# Patient Record
Sex: FEMALE | Race: WHITE | Employment: UNEMPLOYED | URBAN - METROPOLITAN AREA
[De-identification: names, ages, dates, MRNs, and addresses within clinical notes are randomized per-mention and may not be internally consistent; named-entity substitution may affect disease eponyms.]

---

## 2024-01-20 ENCOUNTER — HOSPITAL ENCOUNTER (OUTPATIENT)
Facility: HOSPITAL | Age: 39
Setting detail: OBSERVATION
Discharge: HOME | End: 2024-01-21
Attending: EMERGENCY MEDICINE | Admitting: HOSPITALIST
Payer: COMMERCIAL

## 2024-01-20 ENCOUNTER — APPOINTMENT (OUTPATIENT)
Dept: GENERAL RADIOLOGY | Facility: HOSPITAL | Age: 39
End: 2024-01-20
Attending: EMERGENCY MEDICINE
Payer: COMMERCIAL

## 2024-01-20 DIAGNOSIS — R94.31 ABNORMAL EKG: ICD-10-CM

## 2024-01-20 DIAGNOSIS — R07.9 CHEST PAIN OF UNCERTAIN ETIOLOGY: Primary | ICD-10-CM

## 2024-01-20 LAB
ALBUMIN SERPL-MCNC: 3.5 G/DL (ref 3.4–5)
ALBUMIN/GLOB SERPL: 0.9 {RATIO} (ref 1–2)
ALP LIVER SERPL-CCNC: 56 U/L
ANION GAP SERPL CALC-SCNC: 1 MMOL/L (ref 0–18)
AST SERPL-CCNC: 11 U/L (ref 15–37)
BASOPHILS # BLD AUTO: 0.05 X10(3) UL (ref 0–0.2)
BASOPHILS NFR BLD AUTO: 0.3 %
BILIRUB SERPL-MCNC: 0.3 MG/DL (ref 0.1–2)
BUN BLD-MCNC: 10 MG/DL (ref 9–23)
CALCIUM BLD-MCNC: 8.6 MG/DL (ref 8.5–10.1)
CHLORIDE SERPL-SCNC: 111 MMOL/L (ref 98–112)
CO2 SERPL-SCNC: 29 MMOL/L (ref 21–32)
CREAT BLD-MCNC: 0.59 MG/DL
D DIMER PPP FEU-MCNC: 0.27 UG/ML FEU (ref ?–0.5)
EGFRCR SERPLBLD CKD-EPI 2021: 117 ML/MIN/1.73M2 (ref 60–?)
EOSINOPHIL # BLD AUTO: 0.19 X10(3) UL (ref 0–0.7)
EOSINOPHIL NFR BLD AUTO: 1.1 %
ERYTHROCYTE [DISTWIDTH] IN BLOOD BY AUTOMATED COUNT: 12.3 %
FLUAV + FLUBV RNA SPEC NAA+PROBE: NEGATIVE
FLUAV + FLUBV RNA SPEC NAA+PROBE: NEGATIVE
GLOBULIN PLAS-MCNC: 3.8 G/DL (ref 2.8–4.4)
GLUCOSE BLD-MCNC: 138 MG/DL (ref 70–99)
HCT VFR BLD AUTO: 33.9 %
HGB BLD-MCNC: 11.7 G/DL
IMM GRANULOCYTES # BLD AUTO: 0.07 X10(3) UL (ref 0–1)
IMM GRANULOCYTES NFR BLD: 0.4 %
LYMPHOCYTES # BLD AUTO: 1.13 X10(3) UL (ref 1–4)
LYMPHOCYTES NFR BLD AUTO: 6.5 %
MCH RBC QN AUTO: 28.7 PG (ref 26–34)
MCHC RBC AUTO-ENTMCNC: 34.5 G/DL (ref 31–37)
MCV RBC AUTO: 83.3 FL
MONOCYTES # BLD AUTO: 1.16 X10(3) UL (ref 0.1–1)
MONOCYTES NFR BLD AUTO: 6.7 %
NEUTROPHILS # BLD AUTO: 14.69 X10 (3) UL (ref 1.5–7.7)
NEUTROPHILS # BLD AUTO: 14.69 X10(3) UL (ref 1.5–7.7)
NEUTROPHILS NFR BLD AUTO: 85 %
OSMOLALITY SERPL CALC.SUM OF ELEC: 293 MOSM/KG (ref 275–295)
PLATELET # BLD AUTO: 319 10(3)UL (ref 150–450)
POTASSIUM SERPL-SCNC: 3.7 MMOL/L (ref 3.5–5.1)
PROT SERPL-MCNC: 7.3 G/DL (ref 6.4–8.2)
RBC # BLD AUTO: 4.07 X10(6)UL
RSV RNA SPEC NAA+PROBE: NEGATIVE
SARS-COV-2 RNA RESP QL NAA+PROBE: NOT DETECTED
SODIUM SERPL-SCNC: 141 MMOL/L (ref 136–145)
TROPONIN I SERPL HS-MCNC: 8 NG/L
WBC # BLD AUTO: 17.3 X10(3) UL (ref 4–11)

## 2024-01-20 PROCEDURE — 84484 ASSAY OF TROPONIN QUANT: CPT | Performed by: EMERGENCY MEDICINE

## 2024-01-20 PROCEDURE — 85379 FIBRIN DEGRADATION QUANT: CPT | Performed by: EMERGENCY MEDICINE

## 2024-01-20 PROCEDURE — 80053 COMPREHEN METABOLIC PANEL: CPT | Performed by: EMERGENCY MEDICINE

## 2024-01-20 PROCEDURE — 85025 COMPLETE CBC W/AUTO DIFF WBC: CPT | Performed by: EMERGENCY MEDICINE

## 2024-01-20 PROCEDURE — 93010 ELECTROCARDIOGRAM REPORT: CPT

## 2024-01-20 PROCEDURE — 0241U SARS-COV-2/FLU A AND B/RSV BY PCR (GENEXPERT): CPT | Performed by: EMERGENCY MEDICINE

## 2024-01-20 PROCEDURE — 99285 EMERGENCY DEPT VISIT HI MDM: CPT

## 2024-01-20 PROCEDURE — 36415 COLL VENOUS BLD VENIPUNCTURE: CPT

## 2024-01-20 PROCEDURE — 93005 ELECTROCARDIOGRAM TRACING: CPT

## 2024-01-20 PROCEDURE — 71045 X-RAY EXAM CHEST 1 VIEW: CPT | Performed by: EMERGENCY MEDICINE

## 2024-01-21 ENCOUNTER — APPOINTMENT (OUTPATIENT)
Dept: CV DIAGNOSTICS | Facility: HOSPITAL | Age: 39
End: 2024-01-21
Attending: HOSPITALIST
Payer: COMMERCIAL

## 2024-01-21 VITALS
BODY MASS INDEX: 21 KG/M2 | WEIGHT: 123 LBS | HEART RATE: 79 BPM | TEMPERATURE: 99 F | DIASTOLIC BLOOD PRESSURE: 71 MMHG | OXYGEN SATURATION: 98 % | RESPIRATION RATE: 16 BRPM | SYSTOLIC BLOOD PRESSURE: 116 MMHG | HEIGHT: 64 IN

## 2024-01-21 PROBLEM — R94.31 ABNORMAL EKG: Status: ACTIVE | Noted: 2024-01-21

## 2024-01-21 LAB
BASOPHILS # BLD AUTO: 0.06 X10(3) UL (ref 0–0.2)
BASOPHILS NFR BLD AUTO: 0.4 %
BILIRUB UR QL STRIP.AUTO: NEGATIVE
CLARITY UR REFRACT.AUTO: CLEAR
COLOR UR AUTO: COLORLESS
CRP SERPL-MCNC: 8.65 MG/DL (ref ?–0.3)
EOSINOPHIL # BLD AUTO: 0.12 X10(3) UL (ref 0–0.7)
EOSINOPHIL NFR BLD AUTO: 0.8 %
ERYTHROCYTE [DISTWIDTH] IN BLOOD BY AUTOMATED COUNT: 12.3 %
ERYTHROCYTE [SEDIMENTATION RATE] IN BLOOD: 49 MM/HR
GLUCOSE UR STRIP.AUTO-MCNC: NORMAL MG/DL
HCG UR QL: NEGATIVE
HCT VFR BLD AUTO: 35 %
HGB BLD-MCNC: 11.3 G/DL
IMM GRANULOCYTES # BLD AUTO: 0.08 X10(3) UL (ref 0–1)
IMM GRANULOCYTES NFR BLD: 0.5 %
KETONES UR STRIP.AUTO-MCNC: 60 MG/DL
LEUKOCYTE ESTERASE UR QL STRIP.AUTO: NEGATIVE
LYMPHOCYTES # BLD AUTO: 0.99 X10(3) UL (ref 1–4)
LYMPHOCYTES NFR BLD AUTO: 6.5 %
MCH RBC QN AUTO: 28.3 PG (ref 26–34)
MCHC RBC AUTO-ENTMCNC: 32.3 G/DL (ref 31–37)
MCV RBC AUTO: 87.5 FL
MONOCYTES # BLD AUTO: 1.01 X10(3) UL (ref 0.1–1)
MONOCYTES NFR BLD AUTO: 6.6 %
NEUTROPHILS # BLD AUTO: 13.08 X10 (3) UL (ref 1.5–7.7)
NEUTROPHILS # BLD AUTO: 13.08 X10(3) UL (ref 1.5–7.7)
NEUTROPHILS NFR BLD AUTO: 85.2 %
NITRITE UR QL STRIP.AUTO: NEGATIVE
PH UR STRIP.AUTO: 6 [PH] (ref 5–8)
PLATELET # BLD AUTO: 329 10(3)UL (ref 150–450)
POTASSIUM SERPL-SCNC: 4.2 MMOL/L (ref 3.5–5.1)
PROT UR STRIP.AUTO-MCNC: NEGATIVE MG/DL
RBC # BLD AUTO: 4 X10(6)UL
SP GR UR STRIP.AUTO: 1.01 (ref 1–1.03)
TROPONIN I SERPL HS-MCNC: 9 NG/L
UROBILINOGEN UR STRIP.AUTO-MCNC: NORMAL MG/DL
WBC # BLD AUTO: 15.3 X10(3) UL (ref 4–11)

## 2024-01-21 PROCEDURE — 86140 C-REACTIVE PROTEIN: CPT | Performed by: STUDENT IN AN ORGANIZED HEALTH CARE EDUCATION/TRAINING PROGRAM

## 2024-01-21 PROCEDURE — 93306 TTE W/DOPPLER COMPLETE: CPT | Performed by: HOSPITALIST

## 2024-01-21 PROCEDURE — 84132 ASSAY OF SERUM POTASSIUM: CPT | Performed by: HOSPITALIST

## 2024-01-21 PROCEDURE — 85025 COMPLETE CBC W/AUTO DIFF WBC: CPT | Performed by: HOSPITALIST

## 2024-01-21 PROCEDURE — 81025 URINE PREGNANCY TEST: CPT | Performed by: HOSPITALIST

## 2024-01-21 PROCEDURE — 81001 URINALYSIS AUTO W/SCOPE: CPT | Performed by: STUDENT IN AN ORGANIZED HEALTH CARE EDUCATION/TRAINING PROGRAM

## 2024-01-21 PROCEDURE — 93005 ELECTROCARDIOGRAM TRACING: CPT

## 2024-01-21 PROCEDURE — 84484 ASSAY OF TROPONIN QUANT: CPT | Performed by: HOSPITALIST

## 2024-01-21 PROCEDURE — 93010 ELECTROCARDIOGRAM REPORT: CPT | Performed by: INTERNAL MEDICINE

## 2024-01-21 PROCEDURE — 85652 RBC SED RATE AUTOMATED: CPT | Performed by: STUDENT IN AN ORGANIZED HEALTH CARE EDUCATION/TRAINING PROGRAM

## 2024-01-21 RX ORDER — TOBRAMYCIN 3 MG/ML
1 SOLUTION/ DROPS OPHTHALMIC 4 TIMES DAILY
COMMUNITY

## 2024-01-21 RX ORDER — ONDANSETRON 2 MG/ML
4 INJECTION INTRAMUSCULAR; INTRAVENOUS EVERY 6 HOURS PRN
Status: DISCONTINUED | OUTPATIENT
Start: 2024-01-21 | End: 2024-01-21

## 2024-01-21 RX ORDER — POLYETHYLENE GLYCOL 3350 17 G/17G
17 POWDER, FOR SOLUTION ORAL DAILY PRN
Status: DISCONTINUED | OUTPATIENT
Start: 2024-01-21 | End: 2024-01-21

## 2024-01-21 RX ORDER — BISACODYL 10 MG
10 SUPPOSITORY, RECTAL RECTAL
Status: DISCONTINUED | OUTPATIENT
Start: 2024-01-21 | End: 2024-01-21

## 2024-01-21 RX ORDER — IBUPROFEN 600 MG/1
600 TABLET ORAL 3 TIMES DAILY
Status: DISCONTINUED | OUTPATIENT
Start: 2024-01-21 | End: 2024-01-21

## 2024-01-21 RX ORDER — SENNOSIDES 8.6 MG
17.2 TABLET ORAL NIGHTLY PRN
Status: DISCONTINUED | OUTPATIENT
Start: 2024-01-21 | End: 2024-01-21

## 2024-01-21 RX ORDER — IBUPROFEN 600 MG/1
600 TABLET ORAL 3 TIMES DAILY
Qty: 42 TABLET | Refills: 0 | Status: SHIPPED | OUTPATIENT
Start: 2024-01-21 | End: 2024-02-04

## 2024-01-21 RX ORDER — COLCHICINE 0.6 MG/1
0.6 TABLET ORAL DAILY
Status: DISCONTINUED | OUTPATIENT
Start: 2024-01-22 | End: 2024-01-21

## 2024-01-21 RX ORDER — SODIUM CHLORIDE 9 MG/ML
INJECTION, SOLUTION INTRAVENOUS CONTINUOUS
Status: DISCONTINUED | OUTPATIENT
Start: 2024-01-21 | End: 2024-01-21

## 2024-01-21 RX ORDER — COLCHICINE 0.6 MG/1
0.6 TABLET ORAL 2 TIMES DAILY
Status: DISCONTINUED | OUTPATIENT
Start: 2024-01-21 | End: 2024-01-21

## 2024-01-21 RX ORDER — POTASSIUM CHLORIDE 20 MEQ/1
40 TABLET, EXTENDED RELEASE ORAL ONCE
Status: COMPLETED | OUTPATIENT
Start: 2024-01-21 | End: 2024-01-21

## 2024-01-21 RX ORDER — ENOXAPARIN SODIUM 100 MG/ML
40 INJECTION SUBCUTANEOUS DAILY
Status: DISCONTINUED | OUTPATIENT
Start: 2024-01-21 | End: 2024-01-21

## 2024-01-21 RX ORDER — TRAMADOL HYDROCHLORIDE 50 MG/1
TABLET ORAL EVERY 8 HOURS PRN
Qty: 15 TABLET | Refills: 0 | Status: SHIPPED | OUTPATIENT
Start: 2024-01-21

## 2024-01-21 RX ORDER — ACETAMINOPHEN 500 MG
500 TABLET ORAL EVERY 4 HOURS PRN
Status: SHIPPED | COMMUNITY
Start: 2024-01-21

## 2024-01-21 RX ORDER — ACETAMINOPHEN 500 MG
500 TABLET ORAL EVERY 4 HOURS PRN
Status: DISCONTINUED | OUTPATIENT
Start: 2024-01-21 | End: 2024-01-21

## 2024-01-21 RX ORDER — PROCHLORPERAZINE EDISYLATE 5 MG/ML
5 INJECTION INTRAMUSCULAR; INTRAVENOUS EVERY 8 HOURS PRN
Status: DISCONTINUED | OUTPATIENT
Start: 2024-01-21 | End: 2024-01-21

## 2024-01-21 RX ORDER — MELATONIN
3 NIGHTLY PRN
Status: DISCONTINUED | OUTPATIENT
Start: 2024-01-21 | End: 2024-01-21

## 2024-01-21 RX ORDER — COLCHICINE 0.6 MG/1
0.6 TABLET ORAL DAILY
Qty: 90 TABLET | Refills: 0 | Status: SHIPPED | OUTPATIENT
Start: 2024-01-21 | End: 2024-04-20

## 2024-01-21 RX ORDER — ENEMA 19; 7 G/133ML; G/133ML
1 ENEMA RECTAL ONCE AS NEEDED
Status: DISCONTINUED | OUTPATIENT
Start: 2024-01-21 | End: 2024-01-21

## 2024-01-21 NOTE — ED INITIAL ASSESSMENT (HPI)
Pt presents to ed c/o left sided chest pain that has worsened since 1600 this evening. On arrival pt states pain is almost constant at a 6/10.

## 2024-01-21 NOTE — ED QUICK NOTES
Orders for admission, patient is aware of plan and ready to go upstairs. Any questions, please call ED RN Finn at extension [phone].     Patient Covid vaccination status: Unvaccinated     COVID Test Ordered in ED: SARS-CoV-2/Flu A and B/RSV by PCR (GeneXpert)    COVID Suspicion at Admission: N/A    Running Infusions:  None    Mental Status/LOC at time of transport: a&ox4    Other pertinent information:   CIWA score: N/A   NIH score:  N/A

## 2024-01-21 NOTE — DISCHARGE INSTRUCTIONS
- Ibuprofen 600 mg three times a day WITH meals for 7-14 days, while have pain  - Colchicine 0.6 mg daily for 3 months  - Alternate tylenol and tramadol for breakthrough pain. Tramadol is a narcotic and habit forming and sedating medication.  - F/u with cardiology in 1 month.   - if symptoms do not resolve in 14 days, follow up with cards sooner.

## 2024-01-21 NOTE — H&P
[facility] Hospitalist H&P       CC:   Chief Complaint   Patient presents with    Chest Pain Angina    Difficulty Breathing        PCP: Giovanni Edgar MD    History of Present Illness: Patient is a 38 year old female with PMH sig for hyperlipidemia who presents for evaluation of acute pleuritic chest pain.  Patient states that the pain started yesterday around 4 PM, localized on her left chest, bloating in nature, worse with deep breathing.  Pain was almost constant, did not resolve but did improve with sitting upright.  Patient had a sore throat.  Ago, on Friday she had gone to her PCP and had a viral panel done which was negative.  She also had \"pinkeye\" 2 days ago and she is currently getting antibiotics eyedrops for that.  Patient has 3 young kids, youngest 1 in .  Nobody in the family is currently sick.  No previous history of PE, MI, CVA or any other symptoms.  Patient also endorses fevers of 100 °F at home.  Denies any nausea vomiting shortness of breath abdominal pain dysuria at this time.  In the ER, patient hemodynamically stable.  EKG with sinus tachycardia and T wave abnormalities.  Troponins negative.  Labs with leukocytosis.  Patient was admitted for further workup and management.      PMH  Past Medical History:   Diagnosis Date    Hyperlipidemia         PSH  History reviewed. No pertinent surgical history.     ALL:  No Known Allergies     Home Medications:  Outpatient Medications Marked as Taking for the 1/20/24 encounter (Hospital Encounter)   Medication Sig Dispense Refill    tobramycin 0.3 % Ophthalmic Solution Place 1 drop into both eyes 4 (four) times daily.           Soc Hx  Social History     Tobacco Use    Smoking status: Not on file    Smokeless tobacco: Not on file   Substance Use Topics    Alcohol use: Not on file        Fam Hx  No family history on file.    Review of Systems  Comprehensive ROS reviewed and negative except for what's stated above.     OBJECTIVE:  /76 (BP Location:  Left arm)   Pulse 85   Temp 98.7 °F (37.1 °C) (Oral)   Resp 18   Ht 5' 4\" (1.626 m)   Wt 123 lb (55.8 kg)   LMP 01/15/2024 (Exact Date)   SpO2 98%   BMI 21.11 kg/m²   General:  Alert, no distress   Head:  Normocephalic, without obvious abnormality, atraumatic.   Eyes:  Sclera anicteric, No conjunctival pallor, EOMs intact.    Nose: Nares normal. Septum midline. Mucosa normal. No drainage.   Throat: Lips, mucosa, and tongue normal. Teeth and gums normal.   Neck: Supple, symmetrical, trachea midline,   Lungs:   Clear to auscultation bilaterally. Normal effort   Chest wall:  No tenderness or deformity.   Heart:  Sinus tachycardia no murmur, rub or gallop appreciated   Abdomen:   Soft, non-tender. Bowel sounds normal. No masses,  No organomegaly. Non distended   Extremities: Extremities normal, atraumatic, no cyanosis or edema.   Skin: Skin color, texture, turgor normal. No rashes or lesions.    Neurologic: Normal strength, no focal deficit appreciated     Diagnostic Data:    CBC/Chem  Recent Labs   Lab 01/20/24 2234 01/21/24  0508   WBC 17.3* 15.3*   HGB 11.7* 11.3*   MCV 83.3 87.5   .0 329.0       Recent Labs   Lab 01/20/24 2233 01/21/24  0508     --    K 3.7 4.2     --    CO2 29.0  --    BUN 10  --    CREATSERUM 0.59  --    *  --    CA 8.6  --        Recent Labs   Lab 01/20/24 2233   AST 11*   ALB 3.5       No results for input(s): \"TROP\" in the last 168 hours.    CXR: image personally reviewed     Radiology: XR CHEST AP PORTABLE  (CPT=71045)    Result Date: 1/20/2024  CONCLUSION:  Normal heart size and pulmonary vascularity.  No focal infiltrate, consolidation, effusion or pneumothorax.   LOCATION:  [facility]      Dictated by (CST): Dunia Restrepo MD on 1/20/2024 at 11:08 PM     Finalized by (CST): Dunia Restrepo MD on 1/20/2024 at 11:09 PM          ASSESSMENT / PLAN:      Patient is a 38 year old female with PMH sig for hyperlipidemia who presents for evaluation of acute pleuritic  chest pain    Acute pleuritic chest pain  Suspect acute viral pericarditis  Sinus tachycardia  --EKG with sinus tachycardia and T wave abnormalities  - Dimer negative, troponin negative x 2  - CRP elevated, ESR pending  - Pain with deep breathing, recent viral infection all suggestive of probable viral pericarditis  - Start ibuprofen 600 mg 3 times daily with food  - 2D echo pending to evaluate for pericardial effusion  - Telemetry  - Cards on consult, appreciate recommendations    Leukocytosis  Fevers  - Secondary to above  - Trend CBC IVF  - Supportive care    FN:  - IVF: 0.9 NS at 100 cc/h  - Diet: General    DVT Prophy: SCDs,   Atrophy: Ambulate as tolerated  Lines: PIV    Dispo: Admitted to telemetry  Outpatient records or previous hospital records reviewed.     Further recommendations pending patient's clinical course.  [facility] hospitalist to continue to follow patient while in house.   Discussed with patient and with her  over the phone.  Answered all questions.  Discussed with RN.    Patient and/or patient's family given opportunity to ask questions and note understanding and agreeing with therapeutic plan as outlined    Thank You,  DO Amelia Whittaker Hospitalist  Answering Service number: [phone]

## 2024-01-21 NOTE — CONSULTS
[facility] Cardiology Consult Note       Reason for Consultation: Chest pain    History of Present Illness: Patient is 38 year old female with HLD who presents for chest pain.    Patient notes recent viral illness and conjunctivitis though respiratory panel has been normal. Yesterday afternoon noted pain with deep inspiration on left side, progressed to almost constant mild pain, still worse with breathing, not improved with sitting up. Came to ER for evaluation. EKG with LVH with repolarization. Trop neg. Cardiology consulted.    Cardiac History:  HLD    [unfilled]       ibuprofen  600 mg Oral TID    colchicine  0.6 mg Oral BID    [START ON 1/22/2024] colchicine  0.6 mg Oral Daily       sodium chloride 100 mL/hr at 01/21/24 1014       Past Medical History:   Diagnosis Date    Hyperlipidemia        History reviewed. No pertinent surgical history.      ALLERGIES  No Known Allergies    No family history on file.    Social History     Socioeconomic History    Marital status:      Social Determinants of Health     Food Insecurity: No Food Insecurity (1/21/2024)    Food Insecurity     Food Insecurity: Never true   Transportation Needs: No Transportation Needs (1/21/2024)    Transportation Needs     Lack of Transportation: No   Housing Stability: Low Risk  (1/21/2024)    Housing Stability     Housing Instability: No         Review of Systems:  All systems were reviewed and are negative except as described above in the HPI.      Wt Readings from Last 3 Encounters:   01/21/24 123 lb (55.8 kg)       Intake/Output Summary last 24 hours:    Intake/Output Summary (Last 24 hours) at 1/21/2024 1143  Last data filed at 1/21/2024 1014  Gross per 24 hour   Intake 360 ml   Output --   Net 360 ml       Physical Exam:  @3VITALS@      Gen: Awake, A&Ox3, NAD  Neck: JVP at 6cmH20, trachea midline  EENT: NCAT, normal hearing, EOMI, anicteric  CV: RRR nl s1s2; 2/6 DUANE  Lung: CTAB; no wheezes or crackles noted  Abd: soft, non-tender  Ext: no  sig b/l QUINTIN noted; no signs of clubbing/cyanosis  MSK: no deformities, 4 extremities intact  Skin: no visible rashes, no redness  Neuro: no gross, focal deficits  Psych: appropriate mood & affect    Labs:    Cardiac:  No results for input(s): \"CKMB\", \"BNP\" in the last 168 hours.    Invalid input(s): \"CKTOTAL\", \"CKMBINDEX\", \"TROPONINT\"    HEM:  Recent Labs   Lab 01/20/24 2234 01/21/24  0508   WBC 17.3* 15.3*   HGB 11.7* 11.3*   .0 329.0       Chem:  Recent Labs   Lab 01/20/24 2233 01/21/24  0508     --    K 3.7 4.2     --    CO2 29.0  --    BUN 10  --    AST 11*  --    ALB 3.5  --        Coagulation:  Recent Labs   Lab 01/20/24 2234 01/21/24  0508   DDIMER 0.27  --    HCT 33.9* 35.0   HGB 11.7* 11.3*   .0 329.0       Last Lipid Panel:  No results for input(s): \"CHOLESTEROL\", \"HDL\", \"TRIG\", \"NONHDL\", \"CHOLHDL\" in the last 168 hours.    Invalid input(s): \"LDLCA\"       Diagnostics:    EKG: NSR, LVH with repol  Echo: N/A  Stress Test: N/A  Coronary Angiogram:N/A                                                  ASSESSMENT:  Chest pain  Abnormal EKG    PLAN BY PROBLEM  Chest pain  -Sounds more like pleurisy than pericarditis. Inflammatory markers elevated  -Would treat with 3 months colchicine 0.6mg qday (0.6mg twice today for \"load\")  -Ibuprofen 600mg tid for 2 weeks while pain present  -Echo as below    Abnormal EKG  -Likely unrelated to acute presentation but does have LVH with repol with no clear reason why she should  -Trop negative x2  -Echo  -Based on echo findings, could consider outpatient cMRI.    [place]  -Systolic 2/6  -Echo as above. HCM is a consideration with EKG findings but echo will be telling

## 2024-01-21 NOTE — ED PROVIDER NOTES
Patient Seen in: [facility]      History     Chief Complaint   Patient presents with    Chest Pain Angina    Difficulty Breathing     Stated Complaint: CP & SOB since 1600    Subjective:   HPI    Patient is a 38-year-old female with hyperlipidemia presents for chest pain.  Started at 4 PM.  Left-sided intermittent since 4 PM.  Became more constant over the past couple hours.  Shortness of breath.  Hurts to breathe.  Pleuritic in nature.  No nausea vomiting diaphoresis palpitations or syncope.  No fevers or chills.  No other associate symptoms.  No other complaints.    Patient does report a family history of coronary disease, mother developed cardiac issues and her 60s.  Father passed away with MI in 60s.    Objective:   Past Medical History:   Diagnosis Date    Hyperlipidemia               History reviewed. No pertinent surgical history.             Social History     Socioeconomic History    Marital status:               Review of Systems    Positive for stated complaint: CP & SOB since 1600  Other systems are as noted in HPI.  Constitutional and vital signs reviewed.      All other systems reviewed and negative except as noted above.    Physical Exam     ED Triage Vitals [01/20/24 2224]   /86   Pulse 102   Resp 18   Temp 99.4 °F (37.4 °C)   Temp src Temporal   SpO2 100 %   O2 Device None (Room air)       Current:/84   Pulse 97   Temp 99.4 °F (37.4 °C) (Temporal)   Resp 21   Wt 56.7 kg   LMP 01/15/2024 (Exact Date)   SpO2 100%         Physical Exam  Vitals and nursing note reviewed.   Constitutional:       General: She is not in acute distress.     Appearance: She is well-developed. She is not toxic-appearing.   HENT:      Head: Normocephalic and atraumatic.   Eyes:      General: No scleral icterus.     Conjunctiva/sclera: Conjunctivae normal.   Cardiovascular:      Rate and Rhythm: Normal rate.   Pulmonary:      Effort: Pulmonary effort is normal. No tachypnea  or respiratory distress.      Breath sounds: Normal breath sounds.   Abdominal:      General: There is no distension.   Musculoskeletal:         General: No tenderness. Normal range of motion.      Cervical back: Normal range of motion and neck supple.   Skin:     General: Skin is warm and dry.      Findings: No rash.   Neurological:      Mental Status: She is alert and oriented to person, place, and time.      Motor: No abnormal muscle tone.      Coordination: Coordination normal.   Psychiatric:         Behavior: Behavior normal.              ED Course     Labs Reviewed   COMP METABOLIC PANEL (14) - Abnormal; Notable for the following components:       Result Value    Glucose 138 (*)     AST 11 (*)     A/G Ratio 0.9 (*)     All other components within normal limits   CBC W/ DIFFERENTIAL - Abnormal; Notable for the following components:    WBC 17.3 (*)     HGB 11.7 (*)     HCT 33.9 (*)     Neutrophil Absolute Prelim 14.69 (*)     Neutrophil Absolute 14.69 (*)     Monocyte Absolute 1.16 (*)     All other components within normal limits   TROPONIN I HIGH SENSITIVITY - Normal   D-DIMER - Normal   SARS-COV-2/FLU A AND B/RSV BY PCR (GENEXPERT) - Normal    Narrative:     This test is intended for the qualitative detection and differentiation of SARS-CoV-2, influenza A, influenza B, and respiratory syncytial virus (RSV) viral RNA in nasopharyngeal or nares swabs from individuals suspected of respiratory viral infection consistent with COVID-19 by their healthcare provider. Signs and symptoms of respiratory viral infection due to SARS-CoV-2, influenza, and RSV can be similar.    Test performed using the Xpert Xpress SARS-CoV-2/FLU/RSV (real time RT-PCR)  assay on the GeneXpert instrument, [place], [place] [address].   This test is being used under the Food and Drug Administration's Emergency Use Authorization.    The authorized Fact Sheet for Healthcare Providers for this assay is available upon request from the  laboratory.   CBC WITH DIFFERENTIAL WITH PLATELET    Narrative:     The following orders were created for panel order CBC With Differential With Platelet.  Procedure                               Abnormality         Status                     ---------                               -----------         ------                     CBC W/ DIFFERENTIAL[[id number]]          Abnormal            Final result                 Please view results for these tests on the individual orders.   RAINBOW DRAW LAVENDER   RAINBOW DRAW LIGHT GREEN   RAINBOW DRAW BLUE     EKG    Rate, intervals and axes as noted on EKG Report.  Rate: 104  Rhythm: Sinus Rhythm  Reading: ST T wave abnormality in the inferior lateral leads.                           MDM           -Tracing on cardiac monitor and pulse oximetry was reviewed by myself.   -The cardiac monitor revealed normal sinus rhythm as interpreted by me. The cardiac monitor was ordered to monitor the patient for dysrhythmia  -Pulse oximetry was interpreted by me and was normal.  Pulse oximeter was ordered to monitor patient for hypoxia.        -History source other than patient - spouse        -Comorbidities did add complexity to the management are mentioned in the HPI above        -I personally reviewed the prior external notes and the medical record to obtain additional history -I was able to review patient's  prior EKGs through the [employer] account.  In October 2023 patient had a completely normal appearing EKG without any ST-T wave changes        -DDX: Includes but not limited to -acute coronary syndrome, PE        -I personally reviewed the radiographs findings and they show no acute disease  Please refer to radiology report for official interpretation          Labs Reviewed   COMP METABOLIC PANEL (14) - Abnormal; Notable for the following components:       Result Value    Glucose 138 (*)     AST 11 (*)     A/G Ratio 0.9 (*)     All other components within normal limits   CBC W/  DIFFERENTIAL - Abnormal; Notable for the following components:    WBC 17.3 (*)     HGB 11.7 (*)     HCT 33.9 (*)     Neutrophil Absolute Prelim 14.69 (*)     Neutrophil Absolute 14.69 (*)     Monocyte Absolute 1.16 (*)     All other components within normal limits   TROPONIN I HIGH SENSITIVITY - Normal   D-DIMER - Normal   SARS-COV-2/FLU A AND B/RSV BY PCR (GENEXPERT) - Normal    Narrative:     This test is intended for the qualitative detection and differentiation of SARS-CoV-2, influenza A, influenza B, and respiratory syncytial virus (RSV) viral RNA in nasopharyngeal or nares swabs from individuals suspected of respiratory viral infection consistent with COVID-19 by their healthcare provider. Signs and symptoms of respiratory viral infection due to SARS-CoV-2, influenza, and RSV can be similar.    Test performed using the Xpert Xpress SARS-CoV-2/FLU/RSV (real time RT-PCR)  assay on the [employer] instrument, [place], [place] [address].   This test is being used under the Food and Drug Administration's Emergency Use Authorization.    The authorized Fact Sheet for Healthcare Providers for this assay is available upon request from the laboratory.   CBC WITH DIFFERENTIAL WITH PLATELET    Narrative:     The following orders were created for panel order CBC With Differential With Platelet.  Procedure                               Abnormality         Status                     ---------                               -----------         ------                     CBC W/ DIFFERENTIAL[[id number]]          Abnormal            Final result                 Please view results for these tests on the individual orders.   RAINBOW DRAW LAVENDER   RAINBOW DRAW LIGHT GREEN   RAINBOW DRAW BLUE     Your workup reviewed.  White count 17,000.  D-dimer is negative troponin is negative.  COVID RSV influenza are negative.  Hide of VTE.  No evidence of myocardial infarction.  CMP without any significant abnormalities    I discussed with  patient.  She does have chest pain which is atypical EKG is definitely abnormal when compared to prior in October 2023.  In light of the above, I will admit her to the hospital service for further workup.  I discussed case with the duly hospitalist for admission.        Admission disposition: 1/21/2024 12:01 AM                                        Medical Decision Making      Disposition and Plan     Clinical Impression:  1. Chest pain of uncertain etiology    2. Abnormal EKG         Disposition:  Admit  1/21/2024 12:01 am    Follow-up:  No follow-up provider specified.        Medications Prescribed:  There are no discharge medications for this patient.                        Hospital Problems       Present on Admission             ICD-10-CM Noted POA    * (Principal) Chest pain of uncertain etiology R07.9 1/20/2024 Unknown

## 2024-01-21 NOTE — PLAN OF CARE
0307 Patient is alert and oriented x  4. Sinus Tachycardic on arrival to the floor. 's. She is also febrile. She complainted of left sided chest pain. \" It  hurts to breathe deeply \" per pt. O2 sat 100 % on room air. No signs of distress. Sepsis BPA fired. EKG completed with no change. Dr. Merino notified. K 3.7- replaced per protocol. Follow up labs and echo ordered. Will given PRN pain meds. Will continue to monitor tele and vitals.  Falls and safety precaution maintained  0600 Patient now afebrile. Sinus rhythm. No complaints of pain.   Problem: CARDIOVASCULAR - ADULT  Goal: Absence of cardiac arrhythmias or at baseline  Description: INTERVENTIONS:  - Continuous cardiac monitoring, monitor vital signs, obtain 12 lead EKG if indicated  - Evaluate effectiveness of antiarrhythmic and heart rate control medications as ordered  - Initiate emergency measures for life threatening arrhythmias  - Monitor electrolytes and administer replacement therapy as ordered  Outcome: Progressing     Problem: METABOLIC/FLUID AND ELECTROLYTES - ADULT  Goal: Electrolytes maintained within normal limits  Description: INTERVENTIONS:  - Monitor labs and rhythm and assess patient for signs and symptoms of electrolyte imbalances  - Administer electrolyte replacement as ordered  - Monitor response to electrolyte replacements, including rhythm and repeat lab results as appropriate  - Fluid restriction as ordered  - Instruct patient on fluid and nutrition restrictions as appropriate  Outcome: Progressing     Problem: PAIN - ADULT  Goal: Verbalizes/displays adequate comfort level or patient's stated pain goal  Description: INTERVENTIONS:  - Encourage pt to monitor pain and request assistance  - Assess pain using appropriate pain scale  - Administer analgesics based on type and severity of pain and evaluate response  - Implement non-pharmacological measures as appropriate and evaluate response  - Consider cultural and social influences on  pain and pain management  - Manage/alleviate anxiety  - Utilize distraction and/or relaxation techniques  - Monitor for opioid side effects  - Notify MD/LIP if interventions unsuccessful or patient reports new pain  - Anticipate increased pain with activity and pre-medicate as appropriate  Outcome: Progressing     Problem: Patient/Family Goals  Goal: Patient/Family Long Term Goal  Description: Description: Patient's Long Term Goal: stay healthy at home    Interventions:  -serial troponin  -EKG  -monitor on tele  -echo  -follow up labs  -take medications as prescribed  -attend follow up appointments as recommended  -diet  and exercise as advised  -report new or worsening symptoms to physician       Outcome: Progressing  Goal: Patient/Family Short Term Goal  Description: Patient's Short Term Goal:     1/20- \"no more pain\"    Interventions:   - monitor for complaints of pain  -offer PRN pain meds  Outcome: Progressing

## 2024-01-21 NOTE — PLAN OF CARE
Pt A&Ox4. Lungs clear on RA. NSR/ST on tele. PT had bm yesterday. Voids in bathroom. UACS to be collected. 0.9@ 100ml initiated. Call light and belongings within reach.       Problem: Patient/Family Goals  Goal: Patient/Family Long Term Goal  Description: Description: Patient's Long Term Goal: stay healthy at home    Interventions:  -serial troponin  -EKG  -monitor on tele  -echo  -follow up labs  -take medications as prescribed  -attend follow up appointments as recommended  -diet  and exercise as advised  -report new or worsening symptoms to physician       Outcome: Progressing  Goal: Patient/Family Short Term Goal  Description: Patient's Short Term Goal:     1/20- \"no more pain\"    Interventions:   - monitor for complaints of pain  -offer PRN pain meds  Outcome: Progressing     Problem: CARDIOVASCULAR - ADULT  Goal: Absence of cardiac arrhythmias or at baseline  Description: INTERVENTIONS:  - Continuous cardiac monitoring, monitor vital signs, obtain 12 lead EKG if indicated  - Evaluate effectiveness of antiarrhythmic and heart rate control medications as ordered  - Initiate emergency measures for life threatening arrhythmias  - Monitor electrolytes and administer replacement therapy as ordered  Outcome: Progressing     Problem: METABOLIC/FLUID AND ELECTROLYTES - ADULT  Goal: Electrolytes maintained within normal limits  Description: INTERVENTIONS:  - Monitor labs and rhythm and assess patient for signs and symptoms of electrolyte imbalances  - Administer electrolyte replacement as ordered  - Monitor response to electrolyte replacements, including rhythm and repeat lab results as appropriate  - Fluid restriction as ordered  - Instruct patient on fluid and nutrition restrictions as appropriate  Outcome: Progressing     Problem: PAIN - ADULT  Goal: Verbalizes/displays adequate comfort level or patient's stated pain goal  Description: INTERVENTIONS:  - Encourage pt to monitor pain and request assistance  - Assess  pain using appropriate pain scale  - Administer analgesics based on type and severity of pain and evaluate response  - Implement non-pharmacological measures as appropriate and evaluate response  - Consider cultural and social influences on pain and pain management  - Manage/alleviate anxiety  - Utilize distraction and/or relaxation techniques  - Monitor for opioid side effects  - Notify MD/LIP if interventions unsuccessful or patient reports new pain  - Anticipate increased pain with activity and pre-medicate as appropriate  Outcome: Progressing

## 2024-01-21 NOTE — PLAN OF CARE
NURSING DISCHARGE NOTE    Discharged Home via Wheelchair.  Accompanied by Spouse  Belongings Taken by patient/family.    Discharge instructions reviewed with patient and spouse. Prescription given for pain. Follow up appointments reviewed. Encouraged IS use. All questions answered at this time. IV and tele removed.           Problem: Patient/Family Goals  Goal: Patient/Family Long Term Goal  Description: Description: Patient's Long Term Goal: stay healthy at home    Interventions:  -serial troponin  -EKG  -monitor on tele  -echo  -follow up labs  -take medications as prescribed  -attend follow up appointments as recommended  -diet  and exercise as advised  -report new or worsening symptoms to physician       1/21/2024 1707 by Crystal Vargas RN  Outcome: Adequate for Discharge  1/21/2024 1030 by Crystal Vargas RN  Outcome: Progressing  Goal: Patient/Family Short Term Goal  Description: Patient's Short Term Goal:     1/20- \"no more pain\"    Interventions:   - monitor for complaints of pain  -offer PRN pain meds  1/21/2024 1707 by Crystal Vargas RN  Outcome: Adequate for Discharge  1/21/2024 1030 by Crystal Vargas RN  Outcome: Progressing     Problem: CARDIOVASCULAR - ADULT  Goal: Absence of cardiac arrhythmias or at baseline  Description: INTERVENTIONS:  - Continuous cardiac monitoring, monitor vital signs, obtain 12 lead EKG if indicated  - Evaluate effectiveness of antiarrhythmic and heart rate control medications as ordered  - Initiate emergency measures for life threatening arrhythmias  - Monitor electrolytes and administer replacement therapy as ordered  1/21/2024 1707 by Crystal Vargas RN  Outcome: Adequate for Discharge  1/21/2024 1030 by Crystal Vargas RN  Outcome: Progressing     Problem: METABOLIC/FLUID AND ELECTROLYTES - ADULT  Goal: Electrolytes maintained within normal limits  Description: INTERVENTIONS:  - Monitor labs and rhythm and assess patient for signs and symptoms of electrolyte  imbalances  - Administer electrolyte replacement as ordered  - Monitor response to electrolyte replacements, including rhythm and repeat lab results as appropriate  - Fluid restriction as ordered  - Instruct patient on fluid and nutrition restrictions as appropriate  1/21/2024 1707 by Crystal Vargas RN  Outcome: Adequate for Discharge  1/21/2024 1030 by Crystal Vargas RN  Outcome: Progressing     Problem: PAIN - ADULT  Goal: Verbalizes/displays adequate comfort level or patient's stated pain goal  Description: INTERVENTIONS:  - Encourage pt to monitor pain and request assistance  - Assess pain using appropriate pain scale  - Administer analgesics based on type and severity of pain and evaluate response  - Implement non-pharmacological measures as appropriate and evaluate response  - Consider cultural and social influences on pain and pain management  - Manage/alleviate anxiety  - Utilize distraction and/or relaxation techniques  - Monitor for opioid side effects  - Notify MD/LIP if interventions unsuccessful or patient reports new pain  - Anticipate increased pain with activity and pre-medicate as appropriate  1/21/2024 1707 by Crystal Vargas RN  Outcome: Adequate for Discharge  1/21/2024 1030 by Crystal Vargas RN  Outcome: Progressing

## 2024-01-22 LAB
ATRIAL RATE: 104 BPM
ATRIAL RATE: 111 BPM
P AXIS: 64 DEGREES
P AXIS: 70 DEGREES
P-R INTERVAL: 136 MS
P-R INTERVAL: 156 MS
Q-T INTERVAL: 312 MS
Q-T INTERVAL: 326 MS
QRS DURATION: 78 MS
QRS DURATION: 90 MS
QTC CALCULATION (BEZET): 424 MS
QTC CALCULATION (BEZET): 428 MS
R AXIS: 78 DEGREES
R AXIS: 80 DEGREES
T AXIS: -32 DEGREES
T AXIS: 15 DEGREES
VENTRICULAR RATE: 104 BPM
VENTRICULAR RATE: 111 BPM